# Patient Record
Sex: FEMALE | Race: WHITE | NOT HISPANIC OR LATINO | Employment: FULL TIME | URBAN - NONMETROPOLITAN AREA
[De-identification: names, ages, dates, MRNs, and addresses within clinical notes are randomized per-mention and may not be internally consistent; named-entity substitution may affect disease eponyms.]

---

## 2017-08-24 ENCOUNTER — OFFICE VISIT (OUTPATIENT)
Dept: OBSTETRICS AND GYNECOLOGY | Facility: CLINIC | Age: 42
End: 2017-08-24

## 2017-08-24 VITALS
HEIGHT: 64 IN | WEIGHT: 182 LBS | BODY MASS INDEX: 31.07 KG/M2 | DIASTOLIC BLOOD PRESSURE: 78 MMHG | SYSTOLIC BLOOD PRESSURE: 122 MMHG

## 2017-08-24 DIAGNOSIS — Z12.31 ENCOUNTER FOR SCREENING MAMMOGRAM FOR MALIGNANT NEOPLASM OF BREAST: ICD-10-CM

## 2017-08-24 DIAGNOSIS — R10.2 PELVIC PAIN: ICD-10-CM

## 2017-08-24 DIAGNOSIS — Z01.419 WELL WOMAN EXAM WITH ROUTINE GYNECOLOGICAL EXAM: Primary | ICD-10-CM

## 2017-08-24 DIAGNOSIS — N94.10 DYSPAREUNIA IN FEMALE: ICD-10-CM

## 2017-08-24 LAB
CANDIDA ALBICANS: NEGATIVE
GARDNERELLA VAGINALIS: NEGATIVE
TRICHOMONAS VAGINALIS PCR: NEGATIVE

## 2017-08-24 PROCEDURE — 87491 CHLMYD TRACH DNA AMP PROBE: CPT | Performed by: NURSE PRACTITIONER

## 2017-08-24 PROCEDURE — 87480 CANDIDA DNA DIR PROBE: CPT | Performed by: NURSE PRACTITIONER

## 2017-08-24 PROCEDURE — 87510 GARDNER VAG DNA DIR PROBE: CPT | Performed by: NURSE PRACTITIONER

## 2017-08-24 PROCEDURE — 88142 CYTOPATH C/V THIN LAYER: CPT | Performed by: NURSE PRACTITIONER

## 2017-08-24 PROCEDURE — 99386 PREV VISIT NEW AGE 40-64: CPT | Performed by: NURSE PRACTITIONER

## 2017-08-24 PROCEDURE — 87591 N.GONORRHOEAE DNA AMP PROB: CPT | Performed by: NURSE PRACTITIONER

## 2017-08-24 PROCEDURE — 87660 TRICHOMONAS VAGIN DIR PROBE: CPT | Performed by: NURSE PRACTITIONER

## 2017-08-24 PROCEDURE — 87624 HPV HI-RISK TYP POOLED RSLT: CPT | Performed by: NURSE PRACTITIONER

## 2017-08-24 RX ORDER — IBUPROFEN 400 MG/1
400 TABLET ORAL EVERY 6 HOURS PRN
COMMUNITY
End: 2017-09-05 | Stop reason: SDUPTHER

## 2017-08-24 RX ORDER — MOMETASONE FUROATE 50 UG/1
2 SPRAY, METERED NASAL DAILY
COMMUNITY

## 2017-08-24 RX ORDER — BUPROPION HYDROCHLORIDE 150 MG/1
150 TABLET ORAL DAILY
COMMUNITY

## 2017-08-24 RX ORDER — PROPRANOLOL HYDROCHLORIDE 10 MG/1
10 TABLET ORAL 2 TIMES DAILY
COMMUNITY

## 2017-08-24 NOTE — PROGRESS NOTES
"Subjective   History of Present Illness    Yajaira Oneil is a 41 y.o. female who presents for annual exam. C/o bilateral pelvic pains that are always dull, but will be sharp 7/10 intermittently. She had essure placed in . This has been a worsening issue for the past 7 months. She takes ibuprofen which improves pain sometimes. The pain worsens during intercourse.     Current contraception: Tubal ligation essure  Sexually active?: Yes  Postmenopausal?: No  HRT?: no  Hysterectomy?: no    Date last pap:   History of abnormal Pap smear: no  Date of last mammogram: never  History of abnormal mammogram: no    /78  Ht 64\" (162.6 cm)  Wt 182 lb (82.6 kg)  LMP 2017 (Exact Date)  Breastfeeding? No  BMI 31.24 kg/m2    Past Medical History:   Diagnosis Date   • Acute appendicitis     s/p lap appy      • Benign essential hypertension     wesning off her current medicine      • Contraception    • Encounter for sterilization    • Follow-up examination following surgery     unspecified   • Oral contraceptive use        Past Surgical History:   Procedure Laterality Date   •  SECTION  10/05/1999    Incomplete breech presentation at term. Primary  section, low cervical vertical.   • ESSURE TUBAL LIGATION  2013    Examination under anesthesia, and Essure procedure   • ETHMOIDECTOMY  10/17/2007    Chronic sinusitis. Septal deformity. Bilateral endoscopic ethmoidectomy, maxillary sinus antrostomy, and frontal recess exploration. Septoplasty.   • LAPAROSCOPIC APPENDECTOMY  2012   • PAP SMEAR  2010    negative       The following portions of the patient's history were reviewed and updated as appropriate: allergies, current medications, past family history, past medical history, past social history, past surgical history and problem list.    Review of Systems    Constitutional:  No fatigue, No weight loss, No weight gain, No fever and No chills   Respiratory: No dyspnea, No " cough, No hemopytysis, No wheezing and No pleuritic pain   Cardiovascular: No chest pain, No palpitations, No arrhythmia, No orthopnea, No noctural dyspnea, No edema and No claudication   Breasts: No discharge from nipple, No breast tenderness and No breast mass   Gastrointestinal: No loss of appetite, No dysphagia, No abdominal pain, No nausea, No vomiting, No change in bowel habits, No diarrhea, No constipation and No blood in stool   Genitourinary: No increased frequency of urination, No dysuria, No hematuria, No nocturia, No urinary incontinence, No vaginal discharge, No abnormal vaginal bleeding, No menstrual problem, No menopausal problem and Pelvic pain   Skin: No skin rash, No skin lesion, No dry skin, No pruritus and No nail problem   Neurologic: No headache, No dizziness, No lightheadedness, No syncope, No vertigo, No weakness, No numbness, No tremor and No paresthesia   Psychiatric: No difficulty sleeping, No mood swings, No feeling anxious, No confusion and No memory loss          Objective   Physical Exam    General:  Alert and oriented x 3, Cooperative, Well developed & well nourished and No acute distress   Abdomen: Soft, nondistended, non-tender, without masses or organomegaly   Breast: Inspection negative, no nipple discharge or bleeding, no masses or nodularity palpable and Symmetrical breasts in shape, size, consistency   Genitourinary: Vulva normal, vaginal mucosa normal, bladder nondistended and nontender, cervix normal, uterus normal size and nontender, no adnexal/pelvic tenderness or masses, Bartholin's/Kremlin/Urethral gland WNL   Skin: Skin warm and dry and Pattern of hair growth normal       Assessment/Plan   Yajaira was seen today for gynecologic exam and pelvic pain.    Diagnoses and all orders for this visit:    Well woman exam with routine gynecological exam  -     Liquid-based Pap Smear, Screening    Encounter for screening mammogram for malignant neoplasm of breast  -     Mammo Screening  Digital Tomosynthesis Bilateral With CAD; Future    Pelvic pain  -     US Non-ob Transvaginal; Future  -     Chlamydia trachomatis, Neisseria gonorrhoeae, PCR  -     Gardnerella vaginalis, Trichomonas vaginalis, Candida albicans, PCR    Dyspareunia in female      All questions answered.  Recommended self breast exam  Pap smear obtained.  Will rule out vaginal infections.  Pelvic ultrasound.  Mammogram.  Discussed pelvic pain. Went over GYN causes such as PID, ovarian torsion, ruptured ovarian cysts, ectopic pregnancy, endometriosis, adenomyosis, and uterine fibroids. Advised patient to use NSAIDs and heating pads for symptomatic treatment.  Follow up in 1 week.

## 2017-08-25 ENCOUNTER — CONSULT (OUTPATIENT)
Dept: SURGERY | Facility: CLINIC | Age: 42
End: 2017-08-25

## 2017-08-25 VITALS
DIASTOLIC BLOOD PRESSURE: 76 MMHG | WEIGHT: 181 LBS | HEIGHT: 64 IN | SYSTOLIC BLOOD PRESSURE: 124 MMHG | BODY MASS INDEX: 30.9 KG/M2

## 2017-08-25 DIAGNOSIS — R92.8 ABNORMAL MAMMOGRAM OF LEFT BREAST: Primary | ICD-10-CM

## 2017-08-25 DIAGNOSIS — N63.0 LUMP OR MASS IN BREAST: Primary | ICD-10-CM

## 2017-08-25 LAB
C TRACH RRNA CVX QL NAA+PROBE: NOT DETECTED
N GONORRHOEA RRNA SPEC QL NAA+PROBE: NOT DETECTED

## 2017-08-25 PROCEDURE — 99203 OFFICE O/P NEW LOW 30 MIN: CPT | Performed by: SURGERY

## 2017-08-25 RX ORDER — HYDROCHLOROTHIAZIDE 12.5 MG/1
TABLET ORAL
Refills: 1 | COMMUNITY
Start: 2017-08-13 | End: 2017-09-05 | Stop reason: SDUPTHER

## 2017-08-25 RX ORDER — PHENTERMINE HYDROCHLORIDE 37.5 MG/1
TABLET ORAL
Refills: 0 | COMMUNITY
Start: 2017-08-22 | End: 2017-09-05 | Stop reason: SDUPTHER

## 2017-08-25 NOTE — PROGRESS NOTES
Subjective   Yajaira Oneil is a 41 y.o. female     History of Present Illness underwent her first screening mammogram which led to a left diagnostic mammogram and ultrasound which demonstrated a 6 x 9 mm lobulated solid mass at 12 o'clock position.  Patient's had 2 maternal aunts that had breast cancer and some aunts that had cervical cancer but no first-degree relatives with any breast cancer or ovarian cancer.  Patient is not on any hormones.  She does not feel any masses in her breast and no nipple discharge.  Patient had had some pelvic pain and had a pelvic ultrasound which was done today in the region was not complete.  Patient is to follow up with GYN next week.        Review of Systems   Constitutional: Negative.    Eyes: Negative.    Respiratory: Negative.    Cardiovascular: Negative.    Gastrointestinal: Positive for abdominal pain.        Pelvic Pain   Endocrine: Negative.    Genitourinary: Negative.    Musculoskeletal: Negative.    Skin: Negative.    Allergic/Immunologic: Negative.    Neurological: Positive for headaches.   Hematological: Negative.    Psychiatric/Behavioral: Negative.      Past Medical History:   Diagnosis Date   • Acute appendicitis     s/p lap appy      • Benign essential hypertension     wesning off her current medicine      • Contraception    • Encounter for sterilization    • Follow-up examination following surgery     unspecified   • Oral contraceptive use      Past Surgical History:   Procedure Laterality Date   •  SECTION  10/05/1999    Incomplete breech presentation at term. Primary  section, low cervical vertical.   • ESSURE TUBAL LIGATION  2013    Examination under anesthesia, and Essure procedure   • ETHMOIDECTOMY  10/17/2007    Chronic sinusitis. Septal deformity. Bilateral endoscopic ethmoidectomy, maxillary sinus antrostomy, and frontal recess exploration. Septoplasty.   • LAPAROSCOPIC APPENDECTOMY  2012   • PAP SMEAR  2010     negative     Family History   Problem Relation Age of Onset   • Breast cancer Other    • Heart disease Other    • Hypertension Other    • Ovarian cancer Other    • Thyroid disease Other    • Heart attack Other    • Breast cancer Maternal Aunt    • Ovarian cancer Maternal Aunt      Social History     Social History   • Marital status:      Spouse name: N/A   • Number of children: N/A   • Years of education: N/A     Occupational History   • Not on file.     Social History Main Topics   • Smoking status: Former Smoker   • Smokeless tobacco: Never Used   • Alcohol use No   • Drug use: No   • Sexual activity: Yes     Partners: Male     Birth control/ protection: Surgical     Other Topics Concern   • Not on file     Social History Narrative     Allergies   Allergen Reactions   • Morphine And Related Itching       Current Medications:  Current Outpatient Prescriptions   Medication Sig Dispense Refill   • buPROPion XL (WELLBUTRIN XL) 150 MG 24 hr tablet Take 150 mg by mouth Daily.     • hydrochlorothiazide (HYDRODIURIL) 12.5 MG tablet TK 1 T PO QD  1   • ibuprofen (ADVIL,MOTRIN) 400 MG tablet Take 400 mg by mouth Every 6 (Six) Hours As Needed for Mild Pain .     • mometasone (NASONEX) 50 MCG/ACT nasal spray 2 sprays into each nostril Daily.     • propranolol (INDERAL) 10 MG tablet Take 10 mg by mouth 2 (Two) Times a Day.     • phentermine (ADIPEX-P) 37.5 MG tablet TK 1 T PO QAM  0     No current facility-administered medications for this visit.        Prior to admission medications:    (Not in a hospital admission)    Objective   Physical Exam   Constitutional: She appears well-developed and well-nourished. No distress.   Neck: Normal range of motion. Neck supple.   Cardiovascular: Normal rate.    Pulmonary/Chest: Effort normal.       Abdominal: Soft.   Musculoskeletal: Normal range of motion. She exhibits no edema, tenderness or deformity.   Skin: Skin is warm and dry. No rash noted. No erythema.   Psychiatric: She  has a normal mood and affect. Her behavior is normal. Judgment and thought content normal.       Assessment/Plan    Concerning mass 12 o'clock position left breast.  I agree with radiology that this should undergo biopsy.  I would recommend ultrasound-guided left breast mammotome biopsy and leaving a clip.  I fully discussed the procedure alternatives risk and benefits with the patient and she clearly understands and wishes to proceed      The encounter diagnosis was Lump or mass in breast.                     This document has been electronically signed by Rufino Gould MD on August 25, 2017 3:35 PM      EMR Dragon/Transcription disclaimer:  Much of this encounter note is on electronic transcription/translation of spoken language to printed text.  The electronic translation of spoken language may permit erroneous or at times, nonsensical words or phrases to be inadvertently transcribed;  Although I have reviewed the note for such errors, some may still exist.

## 2017-08-29 ENCOUNTER — TELEPHONE (OUTPATIENT)
Dept: OBSTETRICS AND GYNECOLOGY | Facility: CLINIC | Age: 42
End: 2017-08-29

## 2017-08-30 LAB
LAB AP CASE REPORT: NORMAL
LAB AP GYN ADDITIONAL INFORMATION: NORMAL
LAB AP GYN OTHER FINDINGS: NORMAL
Lab: NORMAL
PATH INTERP SPEC-IMP: NORMAL
STAT OF ADQ CVX/VAG CYTO-IMP: NORMAL

## 2017-09-01 ENCOUNTER — OFFICE VISIT (OUTPATIENT)
Dept: OBSTETRICS AND GYNECOLOGY | Facility: CLINIC | Age: 42
End: 2017-09-01

## 2017-09-01 VITALS
SYSTOLIC BLOOD PRESSURE: 110 MMHG | DIASTOLIC BLOOD PRESSURE: 88 MMHG | HEIGHT: 64 IN | BODY MASS INDEX: 31.24 KG/M2 | WEIGHT: 183 LBS

## 2017-09-01 DIAGNOSIS — R10.2 PELVIC PAIN: Primary | ICD-10-CM

## 2017-09-01 PROCEDURE — 99212 OFFICE O/P EST SF 10 MIN: CPT | Performed by: NURSE PRACTITIONER

## 2017-09-01 RX ORDER — IBUPROFEN 800 MG/1
800 TABLET ORAL EVERY 6 HOURS PRN
Qty: 30 TABLET | Refills: 3 | Status: SHIPPED | OUTPATIENT
Start: 2017-09-01

## 2017-09-01 NOTE — PROGRESS NOTES
"Subjective   History of Present Illness    Yajaira Oneil is a 41 y.o. female who presents for follow up after pelvic U/S. C/o bilateral pelvic pains that are always dull, but will be sharp 7/10 intermittently. She had essure placed in . This has been a worsening issue for the past 7 months, but started around initial time after her essure procedure. She takes ibuprofen which improves pain sometimes. The pain worsens during intercourse. PMH ovarian cysts    Current contraception: Tubal ligation essure  Sexually active?: Yes  Postmenopausal?: No  HRT?: no  Hysterectomy?: no    Date last pap: 2017  History of abnormal Pap smear: no  Date of last mammogram:   History of abnormal mammogram: yes - - having biopsy next tuesday    /88  Ht 64\" (162.6 cm)  Wt 183 lb (83 kg)  LMP 2017 (Exact Date)  BMI 31.41 kg/m2    Past Medical History:   Diagnosis Date   • Acute appendicitis     s/p lap appy      • Benign essential hypertension     wesning off her current medicine      • Contraception    • Encounter for sterilization    • Follow-up examination following surgery     unspecified   • Oral contraceptive use        Past Surgical History:   Procedure Laterality Date   •  SECTION  10/05/1999    Incomplete breech presentation at term. Primary  section, low cervical vertical.   • ESSURE TUBAL LIGATION  2013    Examination under anesthesia, and Essure procedure   • ETHMOIDECTOMY  10/17/2007    Chronic sinusitis. Septal deformity. Bilateral endoscopic ethmoidectomy, maxillary sinus antrostomy, and frontal recess exploration. Septoplasty.   • LAPAROSCOPIC APPENDECTOMY  2012   • PAP SMEAR  2010    negative       The following portions of the patient's history were reviewed and updated as appropriate: allergies, current medications, past family history, past medical history, past social history, past surgical history and problem list.    Review of " Systems    Constitutional:  No fatigue, No weight loss, No weight gain, No fever and No chills   Respiratory: No dyspnea, No cough, No hemopytysis, No wheezing and No pleuritic pain   Cardiovascular: No chest pain, No palpitations, No arrhythmia, No orthopnea, No noctural dyspnea, No edema and No claudication   Breasts: No discharge from nipple, No breast tenderness and No breast mass   Gastrointestinal: No loss of appetite, No dysphagia, No abdominal pain, No nausea, No vomiting, No change in bowel habits, No diarrhea, No constipation and No blood in stool   Genitourinary: No increased frequency of urination, No dysuria, No hematuria, No nocturia, No urinary incontinence, No vaginal discharge, No abnormal vaginal bleeding, No menstrual problem, No menopausal problem and Pelvic pain   Skin: No skin rash, No skin lesion, No dry skin, No pruritus and No nail problem   Neurologic: No headache, No dizziness, No lightheadedness, No syncope, No vertigo, No weakness, No numbness, No tremor and No paresthesia   Psychiatric: No difficulty sleeping, No mood swings, No feeling anxious, No confusion and No memory loss          Objective   Physical Exam    General:  Alert and oriented x 3, Cooperative, Well developed & well nourished and No acute distress   Endovaginal:       - uterus: normal size   [ bilateral Essure tubes noted.                   myomata:  none                     endometrial canal: normal size , measures 0.6 ,  no focal thickening       - ovaries:          - right:  normal size                    echotexture:   physiologic , no complex / solid  mass(es)          - left:    normal size                     echotexture:   physiologic , no complex /  solid mass(es)      - cul-de-sac: none/physiologic amount of free fluid        Misc:  .      IMPRESSION:  CONCLUSION:         1.  Negative examination.        Assessment/Plan   Yajaira was seen today for follow-up.    Diagnoses and all orders for this visit:    Pelvic  pain    Other orders  -     ibuprofen (ADVIL,MOTRIN) 800 MG tablet; Take 1 tablet by mouth Every 6 (Six) Hours As Needed for Mild Pain .      All questions answered.  Follow up in 1 year.     Pain could be d/t scar tissue developed after essure procedure. Rx for 800 mg ibuprofen, advised pt to use heating pads as well. Offered patient the option of seeing Gabrielle Quiles, PT for chronic pelvic pain or MD for surgical options. She desires to tolerate the pain without further treatment for now.

## 2017-09-05 ENCOUNTER — HOSPITAL ENCOUNTER (OUTPATIENT)
Dept: ULTRASOUND IMAGING | Facility: HOSPITAL | Age: 42
Discharge: HOME | End: 2017-09-05
Admitting: SURGERY

## 2017-09-05 VITALS
DIASTOLIC BLOOD PRESSURE: 64 MMHG | WEIGHT: 179.45 LBS | RESPIRATION RATE: 18 BRPM | TEMPERATURE: 96.9 F | OXYGEN SATURATION: 98 % | HEART RATE: 75 BPM | BODY MASS INDEX: 31.8 KG/M2 | HEIGHT: 63 IN | SYSTOLIC BLOOD PRESSURE: 112 MMHG

## 2017-09-05 DIAGNOSIS — N63.0 LUMP OR MASS IN BREAST: ICD-10-CM

## 2017-09-05 LAB — HPV I/H RISK 4 DNA CVX QL PROBE+SIG AMP: NEGATIVE

## 2017-09-05 PROCEDURE — 88305 TISSUE EXAM BY PATHOLOGIST: CPT | Performed by: PATHOLOGY

## 2017-09-05 PROCEDURE — 19083 BX BREAST 1ST LESION US IMAG: CPT | Performed by: SURGERY

## 2017-09-05 PROCEDURE — 88305 TISSUE EXAM BY PATHOLOGIST: CPT | Performed by: SURGERY

## 2017-09-05 PROCEDURE — A4648 IMPLANTABLE TISSUE MARKER: HCPCS

## 2017-09-05 RX ORDER — ZOLPIDEM TARTRATE 10 MG/1
1 TABLET ORAL NIGHTLY
Refills: 5 | COMMUNITY
Start: 2017-08-25

## 2017-09-05 RX ORDER — DIAZEPAM 5 MG/1
5 TABLET ORAL ONCE
Status: COMPLETED | OUTPATIENT
Start: 2017-09-05 | End: 2017-09-05

## 2017-09-05 RX ORDER — HYDROCODONE BITARTRATE AND ACETAMINOPHEN 5; 325 MG/1; MG/1
1 TABLET ORAL ONCE AS NEEDED
Status: DISCONTINUED | OUTPATIENT
Start: 2017-09-05 | End: 2017-09-06 | Stop reason: HOSPADM

## 2017-09-05 RX ORDER — IBUPROFEN 600 MG/1
600 TABLET ORAL EVERY 6 HOURS PRN
Status: DISCONTINUED | OUTPATIENT
Start: 2017-09-05 | End: 2017-09-06 | Stop reason: HOSPADM

## 2017-09-05 RX ORDER — LIDOCAINE HYDROCHLORIDE AND EPINEPHRINE 10; 10 MG/ML; UG/ML
INJECTION, SOLUTION INFILTRATION; PERINEURAL ONCE
Status: CANCELLED | OUTPATIENT
Start: 2017-09-05 | End: 2017-09-05

## 2017-09-05 RX ORDER — PHENTERMINE HYDROCHLORIDE 37.5 MG/1
37.5 TABLET ORAL DAILY
COMMUNITY

## 2017-09-05 RX ORDER — LIDOCAINE HYDROCHLORIDE AND EPINEPHRINE 10; 10 MG/ML; UG/ML
20 INJECTION, SOLUTION INFILTRATION; PERINEURAL ONCE
Status: COMPLETED | OUTPATIENT
Start: 2017-09-05 | End: 2017-09-05

## 2017-09-05 RX ORDER — HYDROCHLOROTHIAZIDE 12.5 MG/1
12.5 TABLET ORAL DAILY
COMMUNITY

## 2017-09-05 RX ADMIN — DIAZEPAM 5 MG: 5 TABLET ORAL at 06:58

## 2017-09-05 RX ADMIN — LIDOCAINE HYDROCHLORIDE,EPINEPHRINE BITARTRATE 20 ML: 10; .01 INJECTION, SOLUTION INFILTRATION; PERINEURAL at 09:18

## 2017-09-06 LAB
LAB AP CASE REPORT: NORMAL
LAB AP INTRADEPARTMENTAL CONSULT: NORMAL
Lab: NORMAL
PATH REPORT.FINAL DX SPEC: NORMAL
PATH REPORT.GROSS SPEC: NORMAL

## 2017-09-06 NOTE — PROGRESS NOTES
Patient after results of biopsy came back.  I informed her of the results and the fact that this was a benign finding.  Patient did well with the procedure.  She's had some bruising but seems to be controlled at this point.  We talked about she may have further bruising what to expect.  Offered to see her on Friday but she wishes to go ahead and cancel that appointment but will call back if she has any other issues orders to see us otherwise.  We'll order a mammogram of the left breast in 3 months and have the patient see us in a week or so after the mammogram.  Patient understands they will call her with appointments for these things and she can call see us at any time sooner if she needs to.

## 2017-09-08 ENCOUNTER — OFFICE VISIT (OUTPATIENT)
Dept: SURGERY | Facility: CLINIC | Age: 42
End: 2017-09-08

## 2017-09-08 DIAGNOSIS — N63.0 BREAST MASS: Primary | ICD-10-CM

## 2017-09-08 PROCEDURE — 99024 POSTOP FOLLOW-UP VISIT: CPT | Performed by: SURGERY

## 2023-04-17 ENCOUNTER — HOSPITAL ENCOUNTER (EMERGENCY)
Facility: HOSPITAL | Age: 48
Discharge: LEFT BEFORE DISCHARGE | End: 2023-04-17
Payer: COMMERCIAL

## 2023-04-17 PROCEDURE — 99211 OFF/OP EST MAY X REQ PHY/QHP: CPT
